# Patient Record
Sex: FEMALE | Race: BLACK OR AFRICAN AMERICAN | NOT HISPANIC OR LATINO | URBAN - METROPOLITAN AREA
[De-identification: names, ages, dates, MRNs, and addresses within clinical notes are randomized per-mention and may not be internally consistent; named-entity substitution may affect disease eponyms.]

---

## 2023-10-24 ENCOUNTER — OUTPATIENT (OUTPATIENT)
Dept: OUTPATIENT SERVICES | Facility: HOSPITAL | Age: 48
LOS: 1 days | End: 2023-10-24
Payer: COMMERCIAL

## 2023-10-24 ENCOUNTER — TRANSCRIPTION ENCOUNTER (OUTPATIENT)
Age: 48
End: 2023-10-24

## 2023-10-24 VITALS
OXYGEN SATURATION: 99 % | DIASTOLIC BLOOD PRESSURE: 72 MMHG | RESPIRATION RATE: 20 BRPM | HEART RATE: 68 BPM | SYSTOLIC BLOOD PRESSURE: 112 MMHG

## 2023-10-24 VITALS
HEART RATE: 72 BPM | WEIGHT: 169.98 LBS | TEMPERATURE: 96 F | DIASTOLIC BLOOD PRESSURE: 71 MMHG | SYSTOLIC BLOOD PRESSURE: 125 MMHG | HEIGHT: 65 IN | RESPIRATION RATE: 14 BRPM | OXYGEN SATURATION: 99 %

## 2023-10-24 DIAGNOSIS — Z12.11 ENCOUNTER FOR SCREENING FOR MALIGNANT NEOPLASM OF COLON: ICD-10-CM

## 2023-10-24 DIAGNOSIS — Z98.891 HISTORY OF UTERINE SCAR FROM PREVIOUS SURGERY: Chronic | ICD-10-CM

## 2023-10-24 PROCEDURE — G0121: CPT

## 2023-10-24 DEVICE — NET RETRV ROT ROTH 2.5MMX230CM: Type: IMPLANTABLE DEVICE | Status: FUNCTIONAL

## 2023-10-24 RX ORDER — SODIUM CHLORIDE 9 MG/ML
500 INJECTION INTRAMUSCULAR; INTRAVENOUS; SUBCUTANEOUS
Refills: 0 | Status: COMPLETED | OUTPATIENT
Start: 2023-10-24 | End: 2023-10-24

## 2023-10-24 RX ADMIN — SODIUM CHLORIDE 75 MILLILITER(S): 9 INJECTION INTRAMUSCULAR; INTRAVENOUS; SUBCUTANEOUS at 08:54

## 2023-10-24 NOTE — ASU DISCHARGE PLAN (ADULT/PEDIATRIC) - NS MD DC FALL RISK RISK
For information on Fall & Injury Prevention, visit: [email or web address] OR  [email or web address] OR  https://www.cdc.gov/steadi/patient.html

## 2023-10-24 NOTE — ASU DISCHARGE PLAN (ADULT/PEDIATRIC) - CARE PROVIDER_API CALL
Lynne Osorio  Gastroenterology  [address] [place] [address], [place] [address]  Phone: [phone]  Fax: [phone]  Follow Up Time:

## 2023-10-24 NOTE — ASU DISCHARGE PLAN (ADULT/PEDIATRIC) - DISCHARGE TO
Tolerated infusion well. Therapy plan reviewed with patient. Verbalizes understanding. Reviewed AVS with patient, reviewed medication information, verbalizes good knowledge of current plan, and has no signs or symptoms to report at this time. Declines copy of AVS. Patient instructed on s/s infection/complication with PICC line to report and instructed on keeping PICC dressing clean, dry and intact patient verbalizes understanding. Next appointment scheduled.
Home

## 2024-02-14 PROBLEM — Z78.9 OTHER SPECIFIED HEALTH STATUS: Chronic | Status: ACTIVE | Noted: 2023-10-24

## 2024-02-15 ENCOUNTER — APPOINTMENT (OUTPATIENT)
Dept: ORTHOPEDIC SURGERY | Facility: CLINIC | Age: 49
End: 2024-02-15
Payer: COMMERCIAL

## 2024-02-15 VITALS — HEIGHT: 64 IN | BODY MASS INDEX: 31.07 KG/M2 | WEIGHT: 182 LBS

## 2024-02-15 DIAGNOSIS — Z78.9 OTHER SPECIFIED HEALTH STATUS: ICD-10-CM

## 2024-02-15 DIAGNOSIS — S64.491A INJURY OF DIGITAL NERVE OF LEFT INDEX FINGER, INITIAL ENCOUNTER: ICD-10-CM

## 2024-02-15 PROBLEM — Z00.00 ENCOUNTER FOR PREVENTIVE HEALTH EXAMINATION: Status: ACTIVE | Noted: 2024-02-15

## 2024-02-15 PROCEDURE — 99205 OFFICE O/P NEW HI 60 MIN: CPT

## 2024-02-15 PROCEDURE — 73130 X-RAY EXAM OF HAND: CPT | Mod: LT

## 2024-02-15 NOTE — ASSESSMENT
[FreeTextEntry1] : The patient was advised of the diagnosis. The natural history of the pathology was explained in full to the patient in layman's terms. All questions were answered. The risks and benefits of surgical and non-surgical treatment alternatives were explained in full to the patient.  discussed nerve laceration and need for surgery if she wants a chance at repair. she understamds that nerve recovery may not be possible. understands risk of persistent numbness and cold itolerance, scar sensitivity  Risks including but not limited to infection, blood loss, tendon damage and delayed tendon disruption, muscle damage, nerve damage, stiffness, pain, arthritis, loss of function, potential for secondary surgery, loss of limb or life. The patient had the opportunity to ask questions and all questions were answered to their satisfaction.

## 2024-02-15 NOTE — IMAGING
[de-identified] : left hand- skin intact healing laceration ulnar side of te proximal digital crease with +tinels ad distal numbnes sin the finger FROM otherwise NVID

## 2024-02-15 NOTE — HISTORY OF PRESENT ILLNESS
[5] : 5 [Dull/Aching] : dull/aching [Sharp] : sharp [Intermittent] : intermittent [de-identified] : 02/15/2024 Ms. ANA BONILLA is a 48 year F  presents today for evaluation of left hand  stabbed herself while trying to reove an avocado pit 2weeks ago. left index finger with ulnar sided numbness.  [] : no

## 2024-02-23 ENCOUNTER — APPOINTMENT (OUTPATIENT)
Age: 49
End: 2024-02-23

## 2024-03-05 ENCOUNTER — APPOINTMENT (OUTPATIENT)
Dept: ORTHOPEDIC SURGERY | Facility: CLINIC | Age: 49
End: 2024-03-05

## (undated) DEVICE — TUBING SUCTION CONN 6FT STERILE

## (undated) DEVICE — TUBING SUCTION 20FT

## (undated) DEVICE — PACK IV START WITH CHG

## (undated) DEVICE — BIOPSY FORCEP RADIAL JAW 4 STANDARD WITH NEEDLE

## (undated) DEVICE — CATH IV SAFE BC 22G X 1" (BLUE)

## (undated) DEVICE — FORCEP RADIAL JAW 4 JUMBO 2.8MM 3.2MM 240CM ORANGE DISP

## (undated) DEVICE — SUCTION YANKAUER NO CONTROL VENT

## (undated) DEVICE — IRRIGATOR BIO SHIELD

## (undated) DEVICE — TUBING IV SET GRAVITY 3Y 100" MACRO

## (undated) DEVICE — POLY TRAP ETRAP

## (undated) DEVICE — SENSOR O2 FINGER ADULT

## (undated) DEVICE — BRUSH COLONOSCOPY CYTOLOGY

## (undated) DEVICE — ELCTR GROUNDING PAD ADULT COVIDIEN

## (undated) DEVICE — CATH IV SAFE BC 20G X 1.16" (PINK)

## (undated) DEVICE — FOLEY HOLDER STATLOCK 2 WAY ADULT

## (undated) DEVICE — CLAMP BX HOT RAD JAW 3

## (undated) DEVICE — SYR LUER LOK 50CC

## (undated) DEVICE — SOL INJ NS 0.9% 500ML 2 PORT